# Patient Record
Sex: MALE | Race: WHITE | NOT HISPANIC OR LATINO | Employment: OTHER | URBAN - METROPOLITAN AREA
[De-identification: names, ages, dates, MRNs, and addresses within clinical notes are randomized per-mention and may not be internally consistent; named-entity substitution may affect disease eponyms.]

---

## 2023-08-24 PROBLEM — M77.10 LATERAL EPICONDYLITIS: Status: ACTIVE | Noted: 2023-08-24

## 2023-08-24 PROBLEM — E03.9 HYPOTHYROID: Status: ACTIVE | Noted: 2023-08-24

## 2023-08-24 PROBLEM — I47.29 NONSUSTAINED VENTRICULAR TACHYCARDIA (MULTI): Status: ACTIVE | Noted: 2023-08-24

## 2023-08-24 PROBLEM — R73.01 IMPAIRED FASTING GLUCOSE: Status: ACTIVE | Noted: 2023-08-24

## 2023-08-24 PROBLEM — E78.00 PURE HYPERCHOLESTEROLEMIA: Status: ACTIVE | Noted: 2023-08-24

## 2023-08-24 PROBLEM — G62.9 NEUROPATHY: Status: ACTIVE | Noted: 2023-08-24

## 2023-08-24 PROBLEM — N50.3 EPIDIDYMAL CYST: Status: ACTIVE | Noted: 2023-08-24

## 2023-08-24 RX ORDER — LEVOTHYROXINE SODIUM 75 UG/1
1 TABLET ORAL
COMMUNITY
Start: 2014-03-05 | End: 2024-03-11 | Stop reason: SDUPTHER

## 2023-08-24 RX ORDER — GABAPENTIN 300 MG/1
1 CAPSULE ORAL NIGHTLY
COMMUNITY
Start: 2021-12-13 | End: 2023-11-02

## 2023-08-24 RX ORDER — PRAVASTATIN SODIUM 40 MG/1
1 TABLET ORAL NIGHTLY
COMMUNITY
Start: 2019-11-22 | End: 2024-03-11 | Stop reason: SDUPTHER

## 2023-08-24 RX ORDER — LANOLIN ALCOHOL/MO/W.PET/CERES
1 CREAM (GRAM) TOPICAL DAILY
COMMUNITY
Start: 2023-04-13 | End: 2023-10-17 | Stop reason: ALTCHOICE

## 2023-08-24 RX ORDER — IBUPROFEN 200 MG
1 TABLET ORAL 3 TIMES DAILY PRN
COMMUNITY

## 2023-08-24 RX ORDER — CEPHRADINE 500 MG
CAPSULE ORAL
COMMUNITY

## 2023-10-18 ENCOUNTER — OFFICE VISIT (OUTPATIENT)
Dept: PRIMARY CARE | Facility: CLINIC | Age: 58
End: 2023-10-18
Payer: COMMERCIAL

## 2023-10-18 VITALS
TEMPERATURE: 97.5 F | HEIGHT: 70 IN | BODY MASS INDEX: 25.2 KG/M2 | OXYGEN SATURATION: 98 % | WEIGHT: 176 LBS | SYSTOLIC BLOOD PRESSURE: 109 MMHG | DIASTOLIC BLOOD PRESSURE: 66 MMHG | HEART RATE: 77 BPM

## 2023-10-18 DIAGNOSIS — Z12.5 SCREENING FOR PROSTATE CANCER: ICD-10-CM

## 2023-10-18 DIAGNOSIS — E78.00 PURE HYPERCHOLESTEROLEMIA: ICD-10-CM

## 2023-10-18 DIAGNOSIS — J06.9 VIRAL UPPER RESPIRATORY TRACT INFECTION: ICD-10-CM

## 2023-10-18 DIAGNOSIS — E03.9 ACQUIRED HYPOTHYROIDISM: ICD-10-CM

## 2023-10-18 DIAGNOSIS — G62.9 NEUROPATHY: ICD-10-CM

## 2023-10-18 DIAGNOSIS — Z00.00 ROUTINE MEDICAL EXAM: Primary | ICD-10-CM

## 2023-10-18 DIAGNOSIS — I47.29 NONSUSTAINED VENTRICULAR TACHYCARDIA (MULTI): ICD-10-CM

## 2023-10-18 DIAGNOSIS — Z12.11 SCREENING FOR COLON CANCER: ICD-10-CM

## 2023-10-18 DIAGNOSIS — R73.01 IMPAIRED FASTING GLUCOSE: ICD-10-CM

## 2023-10-18 DIAGNOSIS — Z23 NEED FOR INFLUENZA VACCINATION: ICD-10-CM

## 2023-10-18 DIAGNOSIS — Z11.4 SCREENING FOR HIV WITHOUT PRESENCE OF RISK FACTORS: ICD-10-CM

## 2023-10-18 PROCEDURE — 99396 PREV VISIT EST AGE 40-64: CPT | Performed by: INTERNAL MEDICINE

## 2023-10-18 PROCEDURE — 90471 IMMUNIZATION ADMIN: CPT | Performed by: INTERNAL MEDICINE

## 2023-10-18 ASSESSMENT — PATIENT HEALTH QUESTIONNAIRE - PHQ9
SUM OF ALL RESPONSES TO PHQ9 QUESTIONS 1 AND 2: 0
1. LITTLE INTEREST OR PLEASURE IN DOING THINGS: NOT AT ALL
2. FEELING DOWN, DEPRESSED OR HOPELESS: NOT AT ALL

## 2023-10-18 ASSESSMENT — ENCOUNTER SYMPTOMS
DEPRESSION: 0
PALPITATIONS: 0
SHORTNESS OF BREATH: 0
LOSS OF SENSATION IN FEET: 0
OCCASIONAL FEELINGS OF UNSTEADINESS: 0

## 2023-10-18 ASSESSMENT — PAIN SCALES - GENERAL: PAINLEVEL: 0-NO PAIN

## 2023-10-18 NOTE — PATIENT INSTRUCTIONS
Shingrix shingles vaccine series of 2  by 2 to 3 months, RSV (respiratory syncytial virus), COVID 19 new vaccine 10-11/23.  All can be obtained at the local pharmacies. Flu VACCINE TODAY.          Get labs done 1 week prior to follow up.    Diagnoses and all orders for this visit:  Routine medical exam  Nonsustained ventricular tachycardia (CMS/HCC)  Pure hypercholesterolemia  Comments:  LDL 92 less than 100 on treatment.  Orders:  -     Lipid Panel; Future  -     Comprehensive Metabolic Panel; Future  Acquired hypothyroidism  Comments:  TSH thryoid level normal.  Orders:  -     TSH with reflex to Free T4 if abnormal; Future  Impaired fasting glucose  Comments:  Blood sugar better.  Orders:  -     Hemoglobin A1C; Future  -     CBC; Future  Screening for colon cancer  -     Fecal Occult Blood Immunoassy; Future  Viral upper respiratory tract infection  Comments:  COVID 19 negative. Likely viral URI.CORICIDIN HBP, Tylenol fluids, can take couple wqeeks to resolve.  Neuropathy  Screening for prostate cancer  Screening for HIV without presence of risk factors  -     HIV 1/2 Antigen/Antibody Screen with Reflex to Confirmation; Future

## 2023-10-18 NOTE — PROGRESS NOTES
[facility]: [facility]  PROGRESS NOTE      David Stearns is a 58 y.o. male that is presenting today for 6 mo fu (Cold symptoms x 2 weeks).    Assessment/Plan   Diagnoses and all orders for this visit:  Routine medical exam  Nonsustained ventricular tachycardia (CMS/HCC)  Pure hypercholesterolemia  Comments:  LDL 92 less than 100 on treatment.  Orders:  -     Lipid Panel; Future  -     Comprehensive Metabolic Panel; Future  Acquired hypothyroidism  Comments:  TSH thryoid level normal.  Orders:  -     TSH with reflex to Free T4 if abnormal; Future  Impaired fasting glucose  Comments:  Blood sugar better.  Orders:  -     Hemoglobin A1C; Future  -     CBC; Future  Screening for colon cancer  -     Fecal Occult Blood Immunoassy; Future  Viral upper respiratory tract infection  Comments:  COVID 19 negative. Likely viral URI.CORICIDIN HBP, Tylenol fluids, can take couple wqeeks to resolve.  Neuropathy  Screening for prostate cancer    Subjective   Wellness visit. Over all health status doing well. Diet reviewed, Mediterranean, low sugar diet suggested. No  active depression, or little interest in doing activites or hopelessness. Home safety reviewed, well light, no throw rugs,etc. No falls. Advanced directives filled out at home.  ADL, and instrumental ADL no limits doing well. Vision screen eye exam yearly, hearing screen 6 ft whisper test normal.  No cognitive decline observed. Screening sheet given.      Review of Systems   Respiratory:  Negative for shortness of breath.    Cardiovascular:  Negative for chest pain and palpitations.   All other systems reviewed and are negative.     Objective   Vitals:    10/18/23 1031   BP: 109/66   Pulse: 77   Temp: 36.4 °C (97.5 °F)   SpO2: 98%      Body mass index is 25.25 kg/m².  Physical Exam  Constitutional:       General: He is not in acute distress.     Appearance: He is not toxic-appearing.   HENT:      Head: Normocephalic and atraumatic.      Right  "Ear: Tympanic membrane and ear canal normal.      Left Ear: Tympanic membrane and ear canal normal.      Nose: Nose normal.      Mouth/Throat:      Pharynx: Oropharynx is clear.   Eyes:      Extraocular Movements: Extraocular movements intact.      Pupils: Pupils are equal, round, and reactive to light.   Cardiovascular:      Rate and Rhythm: Normal rate and regular rhythm.      Heart sounds: Normal heart sounds. No murmur heard.  Pulmonary:      Breath sounds: Normal breath sounds.   Abdominal:      General: Bowel sounds are normal. There is no distension.      Palpations: Abdomen is soft. There is no mass.      Tenderness: There is no abdominal tenderness.   Musculoskeletal:         General: No swelling or tenderness.      Right lower leg: No edema.      Left lower leg: No edema.   Skin:     General: Skin is warm and dry.   Neurological:      General: No focal deficit present.      Mental Status: He is oriented to person, place, and time.      Sensory: No sensory deficit.      Motor: No weakness.      Deep Tendon Reflexes: Reflexes normal.       Diagnostic Results   Lab Results   Component Value Date    GLUCOSE 97 06/05/2019    CALCIUM 9.1 06/05/2019     06/05/2019    K 4.0 06/05/2019    CO2 31 06/05/2019     06/05/2019    BUN 12 06/05/2019    CREATININE 1.1 06/05/2019     Lab Results   Component Value Date    ALT 19 04/29/2019    AST 20 04/29/2019    ALKPHOS 64 04/29/2019    BILITOT 0.6 04/29/2019     Lab Results   Component Value Date    WBC 7.1 05/23/2019    HGB 16.5 05/23/2019    HCT 49.6 05/23/2019    MCV 96.9 05/23/2019     05/23/2019     Lab Results   Component Value Date    CHOL 207 (H) 04/29/2019     Lab Results   Component Value Date    HDL 64 04/29/2019     Lab Results   Component Value Date    LDLCALC 123 04/29/2019     Lab Results   Component Value Date    TRIG 102 04/29/2019     No components found for: \"CHOLHDL\"  Lab Results   Component Value Date    HGBA1C 5.4 04/29/2019 "     Other labs not included in the list above were reviewed either before or during this encounter.    History    Past Medical History:   Diagnosis Date    Impaired fasting glucose 08/24/2023    Neuropathy 08/24/2023    Other conditions influencing health status     Arthritis    Personal history of other endocrine, nutritional and metabolic disease     History of thyroid disease     Past Surgical History:   Procedure Laterality Date    HERNIA REPAIR  06/25/2013    Hernia Repair    KNEE SURGERY  06/25/2013    Knee Surgery     Family History   Problem Relation Name Age of Onset    No Known Problems Mother      Cancer Father       Social History     Socioeconomic History    Marital status:      Spouse name: Not on file    Number of children: Not on file    Years of education: Not on file    Highest education level: Not on file   Occupational History    Not on file   Tobacco Use    Smoking status: Every Day     Types: Cigars     Passive exposure: Past    Smokeless tobacco: Never   Vaping Use    Vaping Use: Never used   Substance and Sexual Activity    Alcohol use: Yes    Drug use: Never    Sexual activity: Not on file   Other Topics Concern    Not on file   Social History Narrative    Not on file     Social Determinants of Health     Financial Resource Strain: Not on file   Food Insecurity: Not on file   Transportation Needs: Not on file   Physical Activity: Not on file   Stress: Not on file   Social Connections: Not on file   Intimate Partner Violence: Not on file   Housing Stability: Not on file     Allergies   Allergen Reactions    Iodine Povacry-Iso Alcohol Rash     Current Outpatient Medications on File Prior to Visit   Medication Sig Dispense Refill    alpha lipoic acid 200 mg capsule as directed Orally      gabapentin (Neurontin) 300 mg capsule Take 1 capsule (300 mg) by mouth once daily at bedtime.      glucosamine/chondr fitzpatrick A sod (OSTEO BI-FLEX ORAL) as directed Orally      ibuprofen (AdviL) 200 mg  tablet Take 1 tablet (200 mg) by mouth 3 times a day as needed. with food or milk      levothyroxine (Synthroid, Levoxyl) 75 mcg tablet Take 1 tablet (75 mcg) by mouth once daily in the morning. Take before meals. on an empty stomach in the morning      pravastatin (Pravachol) 40 mg tablet Take 1 tablet (40 mg) by mouth once daily at bedtime.      [DISCONTINUED] cyanocobalamin (Vitamin B-12) 1,000 mcg tablet Take 1 tablet (1,000 mcg) by mouth once daily.       No current facility-administered medications on file prior to visit.     Immunization History   Administered Date(s) Administered    Flu vaccine, quadrivalent, recombinant, preservative free, adult (FLUBLOK) 09/23/2021, 10/13/2022    Hepatitis B vaccine, adult (RECOMBIVAX, ENGERIX) 11/19/1996, 01/10/1997, 06/11/1997    Influenza, injectable, quadrivalent 10/10/2018, 11/22/2019    Moderna COVID-19 vaccine, bivalent, blue cap/gray label *Check age/dose* 10/30/2022    Moderna SARS-CoV-2 Vaccination 03/26/2021, 04/23/2021, 11/26/2021    Tdap vaccine, age 7 year and older (BOOSTRIX) 11/12/2010, 07/24/2020     Patient's medical history was reviewed and updated either before or during this encounter.    Kimani Davidson MD

## 2023-10-23 PROCEDURE — 82274 ASSAY TEST FOR BLOOD FECAL: CPT

## 2023-10-27 ENCOUNTER — LAB REQUISITION (OUTPATIENT)
Dept: LAB | Facility: HOSPITAL | Age: 58
End: 2023-10-27
Payer: COMMERCIAL

## 2023-10-27 DIAGNOSIS — Z12.11 ENCOUNTER FOR SCREENING FOR MALIGNANT NEOPLASM OF COLON: ICD-10-CM

## 2023-10-29 LAB — HEMOCCULT STL QL IA: NEGATIVE

## 2024-03-11 ENCOUNTER — TELEPHONE (OUTPATIENT)
Dept: PRIMARY CARE | Facility: CLINIC | Age: 59
End: 2024-03-11
Payer: COMMERCIAL

## 2024-03-11 DIAGNOSIS — E78.00 PURE HYPERCHOLESTEROLEMIA: ICD-10-CM

## 2024-03-11 DIAGNOSIS — E03.9 ACQUIRED HYPOTHYROIDISM: ICD-10-CM

## 2024-03-12 RX ORDER — PRAVASTATIN SODIUM 40 MG/1
40 TABLET ORAL NIGHTLY
Qty: 90 TABLET | Refills: 2 | Status: SHIPPED | OUTPATIENT
Start: 2024-03-12

## 2024-03-12 RX ORDER — LEVOTHYROXINE SODIUM 75 UG/1
75 TABLET ORAL
Qty: 90 TABLET | Refills: 2 | Status: SHIPPED | OUTPATIENT
Start: 2024-03-12

## 2024-04-24 ENCOUNTER — OFFICE VISIT (OUTPATIENT)
Dept: PRIMARY CARE | Facility: CLINIC | Age: 59
End: 2024-04-24
Payer: COMMERCIAL

## 2024-04-24 VITALS
TEMPERATURE: 97.6 F | OXYGEN SATURATION: 100 % | WEIGHT: 178 LBS | BODY MASS INDEX: 25.48 KG/M2 | HEIGHT: 70 IN | DIASTOLIC BLOOD PRESSURE: 76 MMHG | HEART RATE: 73 BPM | SYSTOLIC BLOOD PRESSURE: 123 MMHG

## 2024-04-24 DIAGNOSIS — D17.21 LIPOMA OF RIGHT UPPER EXTREMITY: ICD-10-CM

## 2024-04-24 DIAGNOSIS — E78.2 MIXED HYPERLIPIDEMIA: ICD-10-CM

## 2024-04-24 DIAGNOSIS — G62.9 NEUROPATHY: ICD-10-CM

## 2024-04-24 DIAGNOSIS — Z12.5 SCREENING FOR PROSTATE CANCER: ICD-10-CM

## 2024-04-24 DIAGNOSIS — E03.9 ACQUIRED HYPOTHYROIDISM: ICD-10-CM

## 2024-04-24 DIAGNOSIS — M15.9 PRIMARY OSTEOARTHRITIS INVOLVING MULTIPLE JOINTS: ICD-10-CM

## 2024-04-24 DIAGNOSIS — R73.01 IMPAIRED FASTING GLUCOSE: Primary | ICD-10-CM

## 2024-04-24 PROBLEM — I47.29 NONSUSTAINED VENTRICULAR TACHYCARDIA (MULTI): Status: RESOLVED | Noted: 2023-08-24 | Resolved: 2024-04-24

## 2024-04-24 PROBLEM — E66.3 OVERWEIGHT WITH BODY MASS INDEX (BMI) 25.0-29.9: Status: ACTIVE | Noted: 2024-04-24

## 2024-04-24 PROBLEM — N50.3 CYST OF EPIDIDYMIS: Status: ACTIVE | Noted: 2023-04-18

## 2024-04-24 PROBLEM — M19.90 OSTEOARTHRITIS: Status: ACTIVE | Noted: 2024-04-24

## 2024-04-24 PROCEDURE — 99214 OFFICE O/P EST MOD 30 MIN: CPT | Performed by: INTERNAL MEDICINE

## 2024-04-24 RX ORDER — ACETAMINOPHEN, DEXTROMETHORPHAN HBR, DOXYLAMINE SUCCINATE, PHENYLEPHRINE HCL 650; 20; 12.5; 1 MG/30ML; MG/30ML; MG/30ML; MG/30ML
SOLUTION ORAL
COMMUNITY
Start: 2023-04-13 | End: 2024-04-24 | Stop reason: ALTCHOICE

## 2024-04-24 ASSESSMENT — PATIENT HEALTH QUESTIONNAIRE - PHQ9
1. LITTLE INTEREST OR PLEASURE IN DOING THINGS: NOT AT ALL
2. FEELING DOWN, DEPRESSED OR HOPELESS: NOT AT ALL
SUM OF ALL RESPONSES TO PHQ9 QUESTIONS 1 AND 2: 0

## 2024-04-24 ASSESSMENT — ENCOUNTER SYMPTOMS
PALPITATIONS: 0
SHORTNESS OF BREATH: 0

## 2024-04-24 ASSESSMENT — PAIN SCALES - GENERAL: PAINLEVEL: 2

## 2024-04-24 NOTE — PROGRESS NOTES
[facility]: [facility]  PROGRESS NOTE      David Stearns is a 58 y.o. male that is presenting today for Hypothyroidism (6 mo fu, new lump on rt shoulder blade x 2 weeks).    Assessment/Plan   Diagnoses and all orders for this visit:  Impaired fasting glucose  Comments:  HGA1C 5.8%, blood sugar 111, low sugar diet, no worries.  Orders:  -     CBC and Auto Differential; Future  -     Hemoglobin A1C; Future  Screening for prostate cancer  -     PSA; Future  Primary osteoarthritis involving multiple joints  Comments:  Aleve 1-2 with food with golf, not routine. Other Tylenol 500mg 2 caps 2-3 times per day( max dose 3000mg in 24 hrs) Ice/hot topical, aspir cream lidocaine  Neuropathy  Comments:  Gabapentin at night  Mixed hyperlipidemia  Comments:   on pravastatin  Orders:  -     Lipid Panel; Future  -     Comprehensive Metabolic Panel; Future  Acquired hypothyroidism  Comments:  Thyroid normal 4/24.  Orders:  -     TSH with reflex to Free T4 if abnormal; Future  Lipoma of right upper extremity  Comments:  Right upper shoulder fatty mass, lipoma no worries  Other orders  -     Follow Up In Primary Care - Established    Subjective   Follow up  hypothyroid, level Venus, Chol,  pravastatin doing well. Neuropathy gabapentin at night. IFBS, low sugar diet. DJD Aleve 1-2 with golf. Right upper shoulder. No pain golf ball side.    Review of Systems   Respiratory:  Negative for shortness of breath.    Cardiovascular:  Negative for chest pain and palpitations.   All other systems reviewed and are negative.     Objective   Vitals:    04/24/24 1030   BP: 123/76   Pulse: 73   Temp: 36.4 °C (97.6 °F)   SpO2: 100%      Body mass index is 25.54 kg/m².  Physical Exam  Constitutional:       General: He is not in acute distress.     Appearance: He is not toxic-appearing.   HENT:      Head: Normocephalic and atraumatic.      Right Ear: Tympanic membrane and ear canal normal.      Left Ear: Tympanic  "membrane and ear canal normal.      Nose: Nose normal.      Mouth/Throat:      Pharynx: Oropharynx is clear.   Eyes:      Extraocular Movements: Extraocular movements intact.      Pupils: Pupils are equal, round, and reactive to light.   Cardiovascular:      Rate and Rhythm: Normal rate and regular rhythm.      Heart sounds: Normal heart sounds. No murmur heard.  Pulmonary:      Breath sounds: Normal breath sounds.   Abdominal:      General: Bowel sounds are normal. There is no distension.      Palpations: Abdomen is soft. There is no mass.      Tenderness: There is no abdominal tenderness.   Musculoskeletal:         General: No swelling or tenderness.      Right lower leg: No edema.      Left lower leg: No edema.   Skin:     General: Skin is warm and dry.      Findings: Lesion (right golf ball superfical ,soft, no tenderness, mobile lipoma) present.   Neurological:      General: No focal deficit present.      Mental Status: He is oriented to person, place, and time.      Sensory: No sensory deficit.      Motor: No weakness.      Deep Tendon Reflexes: Reflexes normal.     Diagnostic Results   Lab Results   Component Value Date    GLUCOSE 97 06/05/2019    CALCIUM 9.1 06/05/2019     06/05/2019    K 4.0 06/05/2019    CO2 31 06/05/2019     06/05/2019    BUN 12 06/05/2019    CREATININE 1.1 06/05/2019     Lab Results   Component Value Date    ALT 19 04/29/2019    AST 20 04/29/2019    ALKPHOS 64 04/29/2019    BILITOT 0.6 04/29/2019     Lab Results   Component Value Date    WBC 7.1 05/23/2019    HGB 16.5 05/23/2019    HCT 49.6 05/23/2019    MCV 96.9 05/23/2019     05/23/2019     Lab Results   Component Value Date    CHOL 207 (H) 04/29/2019     Lab Results   Component Value Date    HDL 64 04/29/2019     Lab Results   Component Value Date    LDLCALC 123 04/29/2019     Lab Results   Component Value Date    TRIG 102 04/29/2019     No components found for: \"CHOLHDL\"  Lab Results   Component Value Date    " HGBA1C 5.4 04/29/2019     Other labs not included in the list above were reviewed either before or during this encounter.    History    Past Medical History:   Diagnosis Date   • Impaired fasting glucose 08/24/2023   • Lumbar radiculopathy     2/22 MRI L/S L3-4, L4-5 bulges Left L4 nerv root imingment Ed Villegas   • Neuropathy 08/24/2023   • Other conditions influencing health status     Arthritis   • Personal history of other endocrine, nutritional and metabolic disease     History of thyroid disease   • Screening for prostate cancer 10/18/2023    10/23 PSA 0.40   • Syncope     ' echo, ECG, Zio patch 5/19 NSVT 5/19 cath nl Coronaries  BB stop armin, apathetic mood     Past Surgical History:   Procedure Laterality Date   • HERNIA REPAIR  06/25/2013    Hernia Repair   • KNEE SURGERY  06/25/2013    Knee Surgery     Family History   Problem Relation Name Age of Onset   • No Known Problems Mother     • Cancer Father       Social History     Socioeconomic History   • Marital status:      Spouse name: Not on file   • Number of children: Not on file   • Years of education: Not on file   • Highest education level: Not on file   Occupational History   • Not on file   Tobacco Use   • Smoking status: Some Days     Types: Cigars     Passive exposure: Current   • Smokeless tobacco: Never   Vaping Use   • Vaping status: Never Used   Substance and Sexual Activity   • Alcohol use: Yes   • Drug use: Never   • Sexual activity: Not on file   Other Topics Concern   • Not on file   Social History Narrative   • Not on file     Social Determinants of Health     Financial Resource Strain: Not on file   Food Insecurity: Not on file   Transportation Needs: Not on file   Physical Activity: Not on file   Stress: Not on file   Social Connections: Not on file   Intimate Partner Violence: Not on file   Housing Stability: Not on file     Allergies   Allergen Reactions   • Iodine Povacry-Iso Alcohol Rash      Current Outpatient Medications on File Prior to Visit   Medication Sig Dispense Refill   • alpha lipoic acid 200 mg capsule as directed Orally     • gabapentin (Neurontin) 300 mg capsule TAKE 1 CAPSULE AT BEDTIME 90 capsule 2   • ibuprofen (AdviL) 200 mg tablet Take 1 tablet (200 mg) by mouth 3 times a day as needed. with food or milk     • levothyroxine (Synthroid, Levoxyl) 75 mcg tablet Take 1 tablet (75 mcg) by mouth once daily in the morning. Take before meals. on an empty stomach in the morning 90 tablet 2   • NON FORMULARY NT2 Collagen  40mg     • pravastatin (Pravachol) 40 mg tablet Take 1 tablet (40 mg) by mouth once daily at bedtime. 90 tablet 2   • [DISCONTINUED] cyanocobalamin, vitamin B-12, (Vitamin B-12) 1,000 mcg tablet extended release Take by mouth.     • [DISCONTINUED] glucosamine/chondr fitzpatrick A sod (OSTEO BI-FLEX ORAL) as directed Orally       No current facility-administered medications on file prior to visit.     Immunization History   Administered Date(s) Administered   • Flu vaccine (IIV4), preservative free *Check age/dose* 10/18/2023   • Flu vaccine, quadrivalent, recombinant, preservative free, adult (FLUBLOK) 09/23/2021, 10/13/2022   • Hepatitis B vaccine, adult (RECOMBIVAX, ENGERIX) 11/19/1996, 01/10/1997, 06/11/1997   • Influenza, injectable, quadrivalent 10/10/2018, 11/22/2019   • Moderna COVID-19 vaccine, Fall 2023, 12 yeasrs and older (50mcg/0.5mL) 01/17/2024   • Moderna COVID-19 vaccine, bivalent, blue cap/gray label *Check age/dose* 10/30/2022   • Moderna SARS-CoV-2 Vaccination 03/26/2021, 04/23/2021, 11/26/2021   • Tdap vaccine, age 7 year and older (BOOSTRIX, ADACEL) 11/12/2010, 07/24/2020     Patient's medical history was reviewed and updated either before or during this encounter.       Kimani Davidson MD

## 2024-04-24 NOTE — PATIENT INSTRUCTIONS
November, labs 2 weeks before apt.    Diagnoses and all orders for this visit:  Impaired fasting glucose  Comments:  HGA1C 5.8%, blood sugar 111, low sugar diet, no worries.  Orders:  -     CBC and Auto Differential; Future  -     Hemoglobin A1C; Future  Screening for prostate cancer  -     PSA; Future  Primary osteoarthritis involving multiple joints  Comments:  Aleve 1-2 with food with golf, not routine. Other Tylenol 500mg 2 caps 2-3 times per day( max dose 3000mg in 24 hrs) Ice/hot topical, aspir cream lidocaine  Neuropathy  Comments:  Gabapentin at night  Mixed hyperlipidemia  Comments:   on pravastatin  Orders:  -     Lipid Panel; Future  -     Comprehensive Metabolic Panel; Future  Acquired hypothyroidism  Comments:  Thyroid normal 4/24.  Orders:  -     TSH with reflex to Free T4 if abnormal; Future  Lipoma of right upper extremity  Comments:  Right upper shoulder fatty mass, lipoma no worries  Other orders  -     Follow Up In Primary Care - Established

## 2024-07-12 DIAGNOSIS — G62.9 NEUROPATHY: ICD-10-CM

## 2024-07-12 RX ORDER — GABAPENTIN 300 MG/1
300 CAPSULE ORAL NIGHTLY
Qty: 90 CAPSULE | Refills: 2 | Status: SHIPPED | OUTPATIENT
Start: 2024-07-12

## 2024-10-28 DIAGNOSIS — E03.9 ACQUIRED HYPOTHYROIDISM: ICD-10-CM

## 2024-10-28 DIAGNOSIS — E78.00 PURE HYPERCHOLESTEROLEMIA: ICD-10-CM

## 2024-10-28 RX ORDER — PRAVASTATIN SODIUM 40 MG/1
40 TABLET ORAL NIGHTLY
Qty: 90 TABLET | Refills: 0 | Status: SHIPPED | OUTPATIENT
Start: 2024-10-28

## 2024-10-28 RX ORDER — LEVOTHYROXINE SODIUM 75 UG/1
TABLET ORAL
Qty: 90 TABLET | Refills: 0 | Status: SHIPPED | OUTPATIENT
Start: 2024-10-28

## 2024-11-20 ENCOUNTER — APPOINTMENT (OUTPATIENT)
Dept: PRIMARY CARE | Facility: CLINIC | Age: 59
End: 2024-11-20
Payer: COMMERCIAL

## 2025-01-27 DIAGNOSIS — E78.00 PURE HYPERCHOLESTEROLEMIA: ICD-10-CM

## 2025-01-27 DIAGNOSIS — E03.9 ACQUIRED HYPOTHYROIDISM: ICD-10-CM

## 2025-01-27 RX ORDER — LEVOTHYROXINE SODIUM 75 UG/1
TABLET ORAL
Qty: 90 TABLET | Refills: 3 | Status: SHIPPED | OUTPATIENT
Start: 2025-01-27

## 2025-01-27 RX ORDER — PRAVASTATIN SODIUM 40 MG/1
40 TABLET ORAL NIGHTLY
Qty: 90 TABLET | Refills: 3 | Status: SHIPPED | OUTPATIENT
Start: 2025-01-27

## 2025-02-17 ENCOUNTER — OFFICE VISIT (OUTPATIENT)
Dept: PRIMARY CARE | Facility: CLINIC | Age: 60
End: 2025-02-17
Payer: COMMERCIAL

## 2025-02-17 ENCOUNTER — TELEPHONE (OUTPATIENT)
Dept: PRIMARY CARE | Facility: CLINIC | Age: 60
End: 2025-02-17

## 2025-02-17 VITALS
OXYGEN SATURATION: 99 % | HEART RATE: 80 BPM | TEMPERATURE: 97.9 F | BODY MASS INDEX: 26.11 KG/M2 | WEIGHT: 182 LBS | SYSTOLIC BLOOD PRESSURE: 120 MMHG | DIASTOLIC BLOOD PRESSURE: 80 MMHG

## 2025-02-17 DIAGNOSIS — Z12.5 SCREENING FOR PROSTATE CANCER: ICD-10-CM

## 2025-02-17 DIAGNOSIS — R73.01 IMPAIRED FASTING GLUCOSE: ICD-10-CM

## 2025-02-17 DIAGNOSIS — M54.50 ACUTE BILATERAL LOW BACK PAIN WITHOUT SCIATICA: Primary | ICD-10-CM

## 2025-02-17 DIAGNOSIS — Z00.00 ANNUAL PHYSICAL EXAM: Primary | ICD-10-CM

## 2025-02-17 DIAGNOSIS — E03.9 ACQUIRED HYPOTHYROIDISM: ICD-10-CM

## 2025-02-17 DIAGNOSIS — E78.2 MIXED HYPERLIPIDEMIA: ICD-10-CM

## 2025-02-17 PROCEDURE — 99213 OFFICE O/P EST LOW 20 MIN: CPT | Performed by: FAMILY MEDICINE

## 2025-02-17 RX ORDER — GLUCOSAMINE/CHONDRO SU A 500-400 MG
1 TABLET ORAL 3 TIMES DAILY
COMMUNITY

## 2025-02-17 ASSESSMENT — PATIENT HEALTH QUESTIONNAIRE - PHQ9
SUM OF ALL RESPONSES TO PHQ9 QUESTIONS 1 AND 2: 0
2. FEELING DOWN, DEPRESSED OR HOPELESS: NOT AT ALL
1. LITTLE INTEREST OR PLEASURE IN DOING THINGS: NOT AT ALL

## 2025-02-17 ASSESSMENT — PAIN SCALES - GENERAL: PAINLEVEL_OUTOF10: 5

## 2025-02-17 NOTE — TELEPHONE ENCOUNTER
Pt wife Saturnino  called answering service 2-15 at 1129 am pt is having a lot of pain in back and would sherif suggestions  Per LYL  pt can go to UC if needed pt is using Ibuprofen can also take Tylenol and use lidocaine patches

## 2025-02-17 NOTE — PROGRESS NOTES
"Subjective   Patient ID: Edmar Stearns is a 59 y.o. male who presents for Establish Care and Back Pain (Lower back pain for \"years\" after injury. On 2/9 pain was exacerbated by shoveling snow. Does stretching, inversion table, and takes ibuprofen or aleve.).    HPI   He is new patient here for flareup of chronic back pain.  He has had chronic back pain for several years and often sees a chiropractor on a regular basis for this.  He was told at one point that he had some herniated disks but never had surgery or injections.  Been doing pretty well up until last week when he shoveled some snow.  His back felt a little more sore than usual.  4 days after shoveling however he had sudden increase low back pain where he had hard time walking or even standing up.  He took Advil and Tylenol and rest and symptoms improved after 2 days and this morning he is almost back to baseline.  The pain never radiated.  It was always in his lower back right greater than left.  Review of Systems  Denies headache, blurred vision, chest pain, shortness of breath, nausea or vomiting, change in bowel habits or leg pain or swelling.    Objective   /80   Pulse 80   Temp 36.6 °C (97.9 °F) (Temporal)   Wt 82.6 kg (182 lb)   SpO2 99%   BMI 26.11 kg/m²     Physical Exam  Vitals and nurse's notes reviewed  General: no acute distress  HEENT: Normal  Neck: Supple  Lungs: Clear  Cardio: RRR w/o murmur  Abdomen: Soft, nontender, no hepatosplenomegaly  Extremities: No edema, no calf tenderness  Neuro: Alert and oriented with no focal deficits  Back: Symmetric skin folds flexion limited to about 75 degrees extension lateral flexion rotation and limited.  He is able to walk on his heels and toes.  Reflexes are +2 and symmetric in the lower extremities.    Assessment/Plan   Problem List Items Addressed This Visit             ICD-10-CM       High    Acute bilateral low back pain without sciatica - Primary M54.50     I suspect he had an " exacerbation of chronic back pain.  I think it is all soft tissue and does not appear to have any neuro involvement.  Symptoms are better today.  I recommend he continue heat 20 as per application and Advil or Tylenol and light range of motion exercises.  He is can get back in touch with his chiropractor to set up regular meetings again with him for prevention.  We talked about physical therapy but to have decided to hold off on this and pursue his chiropractor.  He is to return to see me in the next several months for a scheduled CPE.  He is to call sooner if he has any recurrence of symptoms.

## 2025-02-17 NOTE — TELEPHONE ENCOUNTER
Patient is a former  Patient and wife Saturnino Stated PIPE would see Patient before his new Patient Appointment in May. Patient's appointment was moved however Patient would like to be seen sooner by PIPE due to back pain. Saturnino stated Patient can't be seen in Urgent Care due to his insurance.    Please advise    Patient can be reached at [phone]   No keep cast dry

## 2025-02-17 NOTE — ASSESSMENT & PLAN NOTE
I suspect he had an exacerbation of chronic back pain.  I think it is all soft tissue and does not appear to have any neuro involvement.  Symptoms are better today.  I recommend he continue heat 20 as per application and Advil or Tylenol and light range of motion exercises.  He is can get back in touch with his chiropractor to set up regular meetings again with him for prevention.  We talked about physical therapy but to have decided to hold off on this and pursue his chiropractor.  He is to return to see me in the next several months for a scheduled CPE.  He is to call sooner if he has any recurrence of symptoms.

## 2025-03-07 ENCOUNTER — TELEPHONE (OUTPATIENT)
Dept: PRIMARY CARE | Facility: CLINIC | Age: 60
End: 2025-03-07
Payer: COMMERCIAL

## 2025-03-07 NOTE — TELEPHONE ENCOUNTER
Patient hurt his back and he's having abdominal pain, could he be seen in the office? On and off Pain for a few weeks Call was triaged to PIPE and he advised that He can be seen in the office for his back. If his abdominal pain is severe then he should be seen in the ER. Patient was told what PIPE advised and stated It's not severe, he thinks he pulled a muscle. PIPE stated it's Okay to be seen in the office. Patient scheduled an appointment.

## 2025-03-10 ENCOUNTER — APPOINTMENT (OUTPATIENT)
Dept: PRIMARY CARE | Facility: CLINIC | Age: 60
End: 2025-03-10
Payer: COMMERCIAL

## 2025-03-10 VITALS
TEMPERATURE: 98.1 F | DIASTOLIC BLOOD PRESSURE: 72 MMHG | OXYGEN SATURATION: 99 % | BODY MASS INDEX: 24.82 KG/M2 | SYSTOLIC BLOOD PRESSURE: 118 MMHG | HEART RATE: 79 BPM | RESPIRATION RATE: 18 BRPM | WEIGHT: 173 LBS

## 2025-03-10 DIAGNOSIS — R10.84 GENERALIZED ABDOMINAL PAIN: Primary | ICD-10-CM

## 2025-03-10 PROCEDURE — 99214 OFFICE O/P EST MOD 30 MIN: CPT | Performed by: FAMILY MEDICINE

## 2025-03-10 PROCEDURE — 4004F PT TOBACCO SCREEN RCVD TLK: CPT | Performed by: FAMILY MEDICINE

## 2025-03-10 RX ORDER — DICYCLOMINE HYDROCHLORIDE 10 MG/1
10 CAPSULE ORAL 4 TIMES DAILY PRN
Qty: 30 CAPSULE | Refills: 0 | Status: SHIPPED | OUTPATIENT
Start: 2025-03-10 | End: 2025-05-09

## 2025-03-10 ASSESSMENT — PAIN SCALES - GENERAL: PAINLEVEL_OUTOF10: 3

## 2025-03-10 NOTE — PROGRESS NOTES
Subjective   Patient ID: Edmar Stearns is a 59 y.o. male who presents for Abdominal Pain (Patient is here for abd pain x 3 weeks).    HPI   He is here for 3-week history of abdominal discomfort and bloating.  He has had no significant change in bowel habit.  He did try Dulcolax which gave him bowel move for a few days more than usual and then he had no bowels for few days but now is back to having about a bowel movement once per day.  He has had no vomiting.  Mild nausea.  When he feels bloated he says hard to bend over to tie his shoes.  He had a bowel obstruction many years ago but never had surgery.  He has never had a colonoscopy.  He inotes no blood in the stool.  Review of Systems  Denies headache, blurred vision, chest pain, shortness of breath,  or leg pain or swelling.    Objective   There were no vitals taken for this visit.    Physical Exam  Vitals and nurse's notes reviewed  General: no acute distress  HEENT: Normal  Neck: Supple  Lungs: Clear  Cardio: RRR w/o murmur  Abdomen: Soft, nontender, no hepatosplenomegaly  Extremities: No edema, no calf tenderness  Neuro: Alert and oriented with no focal deficits]  Assessment/Plan   Problem List Items Addressed This Visit             ICD-10-CM       Medium    Generalized abdominal pain - Primary R10.84     Will refer to GI for further evaluation and possible colonoscopy.  In the meantime a trial of Bentyl to use on appearing basis.  Encouraged him to increase fluid intake.         Relevant Medications    dicyclomine (Bentyl) 10 mg capsule    Other Relevant Orders    Referral to Gastroenterology

## 2025-03-10 NOTE — ASSESSMENT & PLAN NOTE
Will refer to GI for further evaluation and possible colonoscopy.  In the meantime a trial of Bentyl to use on appearing basis.  Encouraged him to increase fluid intake.

## 2025-03-24 ENCOUNTER — APPOINTMENT (OUTPATIENT)
Dept: PRIMARY CARE | Facility: CLINIC | Age: 60
End: 2025-03-24
Payer: COMMERCIAL

## 2025-05-29 ENCOUNTER — APPOINTMENT (OUTPATIENT)
Dept: PRIMARY CARE | Facility: CLINIC | Age: 60
End: 2025-05-29
Payer: COMMERCIAL

## 2025-08-13 ENCOUNTER — APPOINTMENT (OUTPATIENT)
Dept: PRIMARY CARE | Facility: CLINIC | Age: 60
End: 2025-08-13
Payer: COMMERCIAL

## 2025-08-13 ENCOUNTER — TELEPHONE (OUTPATIENT)
Dept: PRIMARY CARE | Facility: CLINIC | Age: 60
End: 2025-08-13

## 2025-08-13 ENCOUNTER — TELEMEDICINE (OUTPATIENT)
Dept: PRIMARY CARE | Facility: CLINIC | Age: 60
End: 2025-08-13
Payer: COMMERCIAL

## 2025-08-13 DIAGNOSIS — U07.1 POSITIVE SELF-ADMINISTERED ANTIGEN TEST FOR COVID-19: Primary | ICD-10-CM

## 2025-08-13 PROCEDURE — 99202 OFFICE O/P NEW SF 15 MIN: CPT

## 2025-08-18 ENCOUNTER — TELEPHONE (OUTPATIENT)
Dept: PRIMARY CARE | Facility: CLINIC | Age: 60
End: 2025-08-18
Payer: COMMERCIAL

## 2025-08-18 DIAGNOSIS — G62.9 NEUROPATHY: ICD-10-CM

## 2025-08-18 RX ORDER — GABAPENTIN 300 MG/1
300 CAPSULE ORAL NIGHTLY
Qty: 90 CAPSULE | Refills: 0 | Status: SHIPPED | OUTPATIENT
Start: 2025-08-18

## 2025-08-18 RX ORDER — GABAPENTIN 300 MG/1
300 CAPSULE ORAL NIGHTLY
Qty: 90 CAPSULE | Refills: 2 | OUTPATIENT
Start: 2025-08-18

## 2025-08-20 ENCOUNTER — APPOINTMENT (OUTPATIENT)
Dept: PRIMARY CARE | Facility: CLINIC | Age: 60
End: 2025-08-20
Payer: COMMERCIAL

## 2025-08-20 ENCOUNTER — OFFICE VISIT (OUTPATIENT)
Dept: PRIMARY CARE | Facility: CLINIC | Age: 60
End: 2025-08-20
Payer: COMMERCIAL

## 2025-08-20 VITALS
SYSTOLIC BLOOD PRESSURE: 115 MMHG | DIASTOLIC BLOOD PRESSURE: 73 MMHG | WEIGHT: 163 LBS | HEART RATE: 82 BPM | BODY MASS INDEX: 23.34 KG/M2 | OXYGEN SATURATION: 97 % | TEMPERATURE: 98.3 F | HEIGHT: 70 IN

## 2025-08-20 DIAGNOSIS — R05.1 ACUTE COUGH: ICD-10-CM

## 2025-08-20 DIAGNOSIS — U07.1 COVID-19 VIRUS INFECTION: Primary | ICD-10-CM

## 2025-08-20 PROCEDURE — 99213 OFFICE O/P EST LOW 20 MIN: CPT | Performed by: FAMILY MEDICINE

## 2025-08-20 PROCEDURE — 3008F BODY MASS INDEX DOCD: CPT | Performed by: FAMILY MEDICINE

## 2025-08-20 RX ORDER — BENZONATATE 200 MG/1
200 CAPSULE ORAL EVERY 8 HOURS PRN
Qty: 15 CAPSULE | Refills: 0 | Status: SHIPPED | OUTPATIENT
Start: 2025-08-20

## 2025-08-20 ASSESSMENT — ENCOUNTER SYMPTOMS
COUGH: 1
SLEEP DISTURBANCE: 1
FATIGUE: 1
APPETITE CHANGE: 1
SORE THROAT: 0
WHEEZING: 0
SHORTNESS OF BREATH: 0
BLOOD IN STOOL: 0
DIARRHEA: 1
FEVER: 0

## 2025-10-06 ENCOUNTER — APPOINTMENT (OUTPATIENT)
Dept: PRIMARY CARE | Facility: CLINIC | Age: 60
End: 2025-10-06
Payer: COMMERCIAL